# Patient Record
Sex: FEMALE | Race: WHITE
[De-identification: names, ages, dates, MRNs, and addresses within clinical notes are randomized per-mention and may not be internally consistent; named-entity substitution may affect disease eponyms.]

---

## 2018-05-28 ENCOUNTER — HOSPITAL ENCOUNTER (EMERGENCY)
Dept: HOSPITAL 17 - PHEFT | Age: 33
Discharge: LEFT BEFORE DISCHARGE | End: 2018-05-28
Payer: SELF-PAY

## 2018-05-28 VITALS — BODY MASS INDEX: 20.94 KG/M2 | HEIGHT: 65 IN | WEIGHT: 125.66 LBS

## 2018-05-28 VITALS
RESPIRATION RATE: 16 BRPM | TEMPERATURE: 98.2 F | OXYGEN SATURATION: 98 % | DIASTOLIC BLOOD PRESSURE: 62 MMHG | HEART RATE: 86 BPM | SYSTOLIC BLOOD PRESSURE: 112 MMHG

## 2018-05-28 DIAGNOSIS — F90.9: ICD-10-CM

## 2018-05-28 DIAGNOSIS — Z53.20: ICD-10-CM

## 2018-05-28 DIAGNOSIS — F17.200: ICD-10-CM

## 2018-05-28 DIAGNOSIS — F11.90: ICD-10-CM

## 2018-05-28 DIAGNOSIS — F41.9: ICD-10-CM

## 2018-05-28 DIAGNOSIS — L03.114: Primary | ICD-10-CM

## 2018-05-28 LAB
BASOPHILS # BLD AUTO: 0 TH/MM3 (ref 0–0.2)
BASOPHILS NFR BLD: 0.4 % (ref 0–2)
BUN SERPL-MCNC: 22 MG/DL (ref 7–18)
CALCIUM SERPL-MCNC: 8.5 MG/DL (ref 8.5–10.1)
CHLORIDE SERPL-SCNC: 107 MEQ/L (ref 98–107)
CREAT SERPL-MCNC: 0.67 MG/DL (ref 0.5–1)
EOSINOPHIL # BLD: 0.1 TH/MM3 (ref 0–0.4)
EOSINOPHIL NFR BLD: 2 % (ref 0–4)
ERYTHROCYTE [DISTWIDTH] IN BLOOD BY AUTOMATED COUNT: 13.4 % (ref 11.6–17.2)
GFR SERPLBLD BASED ON 1.73 SQ M-ARVRAT: 103 ML/MIN (ref 89–?)
GLUCOSE SERPL-MCNC: 106 MG/DL (ref 74–106)
HCO3 BLD-SCNC: 26.8 MEQ/L (ref 21–32)
HCT VFR BLD CALC: 34.8 % (ref 35–46)
HGB BLD-MCNC: 11.5 GM/DL (ref 11.6–15.3)
LYMPHOCYTES # BLD AUTO: 1 TH/MM3 (ref 1–4.8)
LYMPHOCYTES NFR BLD AUTO: 18.1 % (ref 9–44)
MCH RBC QN AUTO: 28.3 PG (ref 27–34)
MCHC RBC AUTO-ENTMCNC: 33.2 % (ref 32–36)
MCV RBC AUTO: 85.4 FL (ref 80–100)
MONOCYTE #: 0.5 TH/MM3 (ref 0–0.9)
MONOCYTES NFR BLD: 9.2 % (ref 0–8)
NEUTROPHILS # BLD AUTO: 3.8 TH/MM3 (ref 1.8–7.7)
NEUTROPHILS NFR BLD AUTO: 70.3 % (ref 16–70)
PLATELET # BLD: 161 TH/MM3 (ref 150–450)
PMV BLD AUTO: 8.8 FL (ref 7–11)
RBC # BLD AUTO: 4.07 MIL/MM3 (ref 4–5.3)
SODIUM SERPL-SCNC: 140 MEQ/L (ref 136–145)
WBC # BLD AUTO: 5.4 TH/MM3 (ref 4–11)

## 2018-05-28 PROCEDURE — 96374 THER/PROPH/DIAG INJ IV PUSH: CPT

## 2018-05-28 PROCEDURE — 85025 COMPLETE CBC W/AUTO DIFF WBC: CPT

## 2018-05-28 PROCEDURE — 73130 X-RAY EXAM OF HAND: CPT

## 2018-05-28 PROCEDURE — 84703 CHORIONIC GONADOTROPIN ASSAY: CPT

## 2018-05-28 PROCEDURE — 80048 BASIC METABOLIC PNL TOTAL CA: CPT

## 2018-05-28 NOTE — PD
HPI


Chief Complaint:  Skin Problem


Time Seen by Provider:  16:29


Travel History


International Travel<30 days:  No


Contact w/Intl Traveler<30days:  No


Traveled to known affect area:  No





History of Present Illness


HPI


Is a 32-year-old female here with pain and swelling in the left hand 4 days.  

She reports to injecting heroin into the hand and missed 4 days ago.  Since 

that time she has had increased swelling and pain to the hand.  No fever or 

chills.  No chest pain or shortness of breath.  She can freely move the fingers 

but has difficulty making a full fist due to the swelling.  She reports aching 

pain within the hand.  No aggravating or alleviating factors.





PFSH


Past Medical History


ADHD:  Yes


Anxiety:  Yes


Cancer:  No


Cardiovascular Problems:  No


Diminished Hearing:  No


Endocrine:  No


Genitourinary:  No


Immune Disorder:  No


Musculoskeletal:  No


Neurologic:  No


Psychiatric:  Yes


Reproductive:  No


Respiratory:  Yes


Pregnant?:  Not Pregnant


LMP:  LAST MONTH


:  2


Para:  1


Miscarriage:  1


:  0





Past Surgical History


Abdominal Surgery:  No


Cardiac Surgery:  Yes (CARDIAC SURGERY IN 2012 TUMOR IN [facility])


 Section:  Yes


Ear Surgery:  No


Endocrine Surgery:  No


Eye Surgery:  No


Genitourinary Surgery:  No


Gynecologic Surgery:  Yes ( 10/06)


Oral Surgery:  No


Thoracic Surgery:  No


Other Surgery:  Yes (OPEN HEART MASS REMOVED FROM RIGHT [facility])





Social History


Alcohol Use:  Yes (RARELY)


Tobacco Use:  Yes (1 PPD)


Substance Use:  No





Allergies-Medications


(Allergen,Severity, Reaction):  


Coded Allergies:  


     red dye (Unverified  Allergy, Severe, 18)


Reported Meds & Prescriptions





Reported Meds & Active Scripts


Active


No Active Prescriptions or Reported Medications    








Review of Systems


Except as stated in HPI:  all other systems reviewed are Neg


General / Constitutional:  No: Fever


Eyes:  No: Visual changes


HENT:  No: Headaches


Cardiovascular:  No: Chest Pain or Discomfort


Respiratory:  No: Shortness of Breath


Gastrointestinal:  No: Abdominal Pain


Genitourinary:  No: Dysuria


Musculoskeletal:  Positive: Pain (Left hand)


Skin:  No Rash





Physical Exam


Narrative


GENERAL: Alert and well-appearing 32-year-old female


SKIN: Warm and dry.


HEAD: Normocephalic.


EYES:  No injection or drainage. 


NECK: Supple, trachea midline. 


CARDIOVASCULAR: Regular rate and rhythm.  No murmur appreciated


RESPIRATORY: Breath sounds equal bilaterally. No accessory muscle use.


GASTROINTESTINAL: Abdomen soft, non-tender, nondistended. 


MUSCULOSKELETAL: No cyanosis. LUE: Notable edema to the hand extending up 1/3rd 

of the distal forearm.  Mild erythema noted to the dorsal aspect of the hand.  

Can freely wiggle the fingers unable to fully make a fist due to the swelling.  

Normal sensation.  Brisk cap refill.











Data


Data


Last Documented VS





Vital Signs








  Date Time  Temp Pulse Resp B/P (MAP) Pulse Ox O2 Delivery O2 Flow Rate FiO2


 


18 16:21 98.2 86 16 112/62 (79) 98   








Orders





 Orders


Basic Metabolic Panel (Bmp) (18 16:35)


Complete Blood Count With Diff (18 16:35)


Iv Access Insert/Monitor (18 16:35)


Hand, Complete ([id number]) (18 )


Ed Urine Pregnancytest Poc (18 16:35)


Clindamycin Inj (Cleocin Inj) (18 17:45)





Labs





Laboratory Tests








Test


  18


16:50


 


White Blood Count 5.4 TH/MM3 


 


Red Blood Count 4.07 MIL/MM3 


 


Hemoglobin 11.5 GM/DL 


 


Hematocrit 34.8 % 


 


Mean Corpuscular Volume 85.4 FL 


 


Mean Corpuscular Hemoglobin 28.3 PG 


 


Mean Corpuscular Hemoglobin


Concent 33.2 % 


 


 


Red Cell Distribution Width 13.4 % 


 


Platelet Count 161 TH/MM3 


 


Mean Platelet Volume 8.8 FL 


 


Neutrophils (%) (Auto) 70.3 % 


 


Lymphocytes (%) (Auto) 18.1 % 


 


Monocytes (%) (Auto) 9.2 % 


 


Eosinophils (%) (Auto) 2.0 % 


 


Basophils (%) (Auto) 0.4 % 


 


Neutrophils # (Auto) 3.8 TH/MM3 


 


Lymphocytes # (Auto) 1.0 TH/MM3 


 


Monocytes # (Auto) 0.5 TH/MM3 


 


Eosinophils # (Auto) 0.1 TH/MM3 


 


Basophils # (Auto) 0.0 TH/MM3 


 


CBC Comment DIFF FINAL 


 


Differential Comment  


 


Blood Urea Nitrogen 22 MG/DL 


 


Creatinine 0.67 MG/DL 


 


Random Glucose 106 MG/DL 


 


Calcium Level 8.5 MG/DL 


 


Sodium Level 140 MEQ/L 


 


Potassium Level 3.9 MEQ/L 


 


Chloride Level 107 MEQ/L 


 


Carbon Dioxide Level 26.8 MEQ/L 


 


Anion Gap 6 MEQ/L 


 


Estimat Glomerular Filtration


Rate 103 ML/MIN 


 











MDM


Medical Decision Making


Medical Screen Exam Complete:  Yes


Emergency Medical Condition:  Yes


Differential Diagnosis


Abscess, deep space infection, cellulitis


Narrative Course


32-year-old female here with left hand pain and swelling 4 days.  She has a 

history of IV drug use.  The infection started at the site of a venipuncture.  

She has diffuse swelling and tenderness over the dorsal aspect of the hand.  

There is no superficial drainable abscess identified.  I suspect the infection 

involves deeper structures.  Workup was initiated.  Patient then informed me 

she would not agree to admission in the hospital and wanted to sign out AMA.  

Risk of sepsis, limb loss and death discussed with patient.  She assumes these 

risks and wishes to sign out.  She will be given a prescription for clindamycin 

and strongly encouraged to return.





Diagnosis





 Primary Impression:  


 Cellulitis


 Qualified Codes:  L03.114 - Cellulitis of left upper limb


Scripts


Clindamycin (Clindamycin) 300 Mg Cap


300 MG PO Q6H for Infection for 10 Days, #40 CAP 0 Refills


   Prov: Lanette Rodriguez         18


Disposition:  07 AGAINST MEDICAL ADVICE











Lanette Rodriguez May 28, 2018 17:38

## 2018-05-28 NOTE — RADRPT
EXAM DATE:  5/28/2018 5:08 PM EDT

AGE/SEX:        32 years / Female



INDICATIONS:  Left hand redness and swelling post drug use.



CLINICAL DATA:  This is the patient's initial encounter. Patient reports that signs and symptoms have
 been present for 2 days and indicates a pain score of 0/10. 

                                                                          

MEDICAL/SURGICAL HISTORY:       None. None.



COMPARISON:      No prior [facility] exams available for comparison.



FINDINGS:  

There is soft tissue swelling on the left hand, especially the dorsum of the hand. No acute bony abno
rmalities identified.



CONCLUSION: 

No acute bony abnormalities. Soft tissue swelling of the left hand.









Electronically signed by: Jhonatan Velazquez MD  5/28/2018 5:11 PM EDT